# Patient Record
Sex: MALE | Race: BLACK OR AFRICAN AMERICAN
[De-identification: names, ages, dates, MRNs, and addresses within clinical notes are randomized per-mention and may not be internally consistent; named-entity substitution may affect disease eponyms.]

---

## 2019-08-12 NOTE — EMERGENCY ROOM REPORT
History of Present Illness


General


Chief Complaint:  Male Urogenital Problems


Source:  Patient





Present Illness


HPI


49-year-old male with no significant past medical history here requesting to be 

tested for chlamydia and gonorrhea and is requesting prophylactic antibiotic 

treatment as he had recent exposure to an unknown sexually transmitted 

diseases.  Patient is here with his significant other was also complaining of 

vaginal discharge.  Patient is complaining of clear penile discharge denies 

dysuria urinary frequency.  Denies fever and chills.  Denies abdominal pain, 

nausea vomiting, chest pain, shortness of breath, and all other associated 

symptoms.  Has not taken medication for his symptoms.


Allergies:  


Coded Allergies:  


     No Known Allergies (Unverified , 8/12/19)





Patient History


Past Medical History:  see triage record


Past Surgical History:  unable to obtain


Pertinent Family History:  none


Immunizations:  UTD


Reviewed Nursing Documentation:  PMH: Agreed; PSxH: Agreed





Review of Systems


All Other Systems:  negative except mentioned in HPI





Physical Exam





Vital Signs








  Date Time  Temp Pulse Resp B/P (MAP) Pulse Ox O2 Delivery O2 Flow Rate FiO2


 


8/12/19 13:46 98.1 78 19 130/86 (101) 99 Room Air  








Sp02 EP Interpretation:  reviewed, normal


General Appearance:  normal inspection, well appearing, no apparent distress


Head:  normocephalic, atraumatic


Eyes:  bilateral eye normal inspection, bilateral eye PERRL


ENT:  normal ENT inspection, hearing grossly normal, normal pharynx


Neck:  normal inspection, supple


Respiratory:  normal inspection, chest non-tender, lungs clear, no rhonchi, no 

wheezing


Cardiovascular #1:  normal inspection, regular rate, rhythm, no edema


Gastrointestinal:  normal inspection, non tender, soft


Rectal:  deferred


Genitourinary:  no CVA tenderness


Musculoskeletal:  back normal


Neurologic:  normal inspection, alert, oriented x3


Psychiatric:  normal inspection, judgement/insight normal


Skin:  no rash


Lymphatic:  normal inspection, no adenopathy





Medical Decision Making


PA Attestation


All diagnoses and treatment plans were reviewed and discussed with my 

supervising physician Dr. Powell


Diagnostic Impression:  


 Primary Impression:  


 STD exposure


ER Course


49-year-old male with no significant past medical history here requesting to be 

tested for chlamydia and gonorrhea and is requesting prophylactic antibiotic 

treatment as he had recent exposure to an unknown sexually transmitted 

diseases.  Patient is here with his significant other was also complaining of 

vaginal discharge.  Patient is complaining of clear penile discharge denies 

dysuria urinary frequency.  Denies fever and chills.  Denies abdominal pain, 

nausea vomiting, chest pain, shortness of breath, and all other associated 

symptoms.  Has not taken medication for his symptoms.





Ddx considered but are not limited to: vaginitis, yeast infection, BV, chlamydia

, Gohnorrea, syphylis, HIV, herpes 1 or 2














Vital signs: are WNL, pt. is afebrile








 H&PE are most consistent with : STD exposure possibly gonorrhea or chlamydia














ORDERS: UA, chlamydia and gonorrhea, Rocephin, azithromycin











ED INTERVENTIONS: Rocephin IM and azithromycin were given




















DISCHARGE: At this time pt. is stable for d/c to home. Will provide printed 

patient care instructions, and any necessary prescriptions. Care plan and 

follow up instructions have been discussed with the patient prior to discharge.

  Pending results for chlamydia and gonorrhea follow-up with her primary care 

provider if symptoms continue if patient's significant other test is positive 

for trichomoniasis, patient will also be tested and treated.





Last Vital Signs








  Date Time  Temp Pulse Resp B/P (MAP) Pulse Ox O2 Delivery O2 Flow Rate FiO2


 


8/12/19 13:58 98.1 85 20 129/79 99 Room Air  








Disposition:  HOME, SELF-CARE


Condition:  Stable


Patient Instructions:  Chlamydia, Male, Gonorrhea





Additional Instructions:  


Take medication as directed follow-up with your primary care provider if 

symptoms do not improve











Shorty Salinas Aug 12, 2019 14:04

## 2019-08-12 NOTE — NUR
ED Nurse Note:



Pt has been experiencing penile white discharge with fishy odor x 1 week. AOx4, 
VSS feng. Will cont to monitor.

## 2019-10-17 NOTE — EMERGENCY ROOM REPORT
History of Present Illness


General


Chief Complaint:  Multiple Trauma/Fall


Source:  Patient





Present Illness


HPI


This patient states that he fell about 10 feet.  He was climbing a gait to get 

a basketball out of a tree and lost his balance.  He states that he hit his 

head and has a headache.  He also complains of a nosebleed.  He complains of 

pain in his left upper arm, elbow and forearm.  He denies chest pain or 

shortness of breath.  He denies abdominal pain.  He denies right arm or lower 

extremity pain.  He denies loss of consciousness.  He denies weakness.  He 

denies tingling or numbness.  He has no other complaints.


Allergies:  


Coded Allergies:  


     No Known Allergies (Unverified , 8/12/19)





Patient History


Past Medical History:  none, see triage record


Social History:  Denies: smoking, alcohol use, drug use


Reviewed Nursing Documentation:  PMH: Agreed; PSxH: Agreed





Nursing Documentation-PMH


Past Medical History:  No History, Except For





Review of Systems


All Other Systems:  negative except mentioned in HPI





Physical Exam





Vital Signs








  Date Time  Temp Pulse Resp B/P (MAP) Pulse Ox O2 Delivery O2 Flow Rate FiO2


 


10/17/19 17:50 97.9 73 18 142/98 (113) 99 Room Air  








Sp02 EP Interpretation:  reviewed, normal


General Appearance:  no apparent distress, alert, GCS 15, non-toxic


Head:  normocephalic, other - Swelling over L. parietal area.


Eyes:  bilateral eye normal inspection, bilateral eye PERRL


ENT:  hearing grossly normal, normal pharynx, no angioedema, normal voice, 

other - +nosebleed


Neck:  full range of motion, supple/symm/no masses, tender lateral


Respiratory:  chest non-tender, lungs clear, normal breath sounds, no 

respiratory distress, no retraction, no accessory muscle use, speaking full 

sentences


Cardiovascular #1:  regular rate, rhythm, no edema


Gastrointestinal:  normal bowel sounds, non tender, soft, non-distended, no 

guarding, no rebound


Rectal:  deferred


Musculoskeletal:  back normal, tender - TTP over L. elbow, L. humerus and L. 

forearm.  PT will not move elbow secondary to pain.


Neurologic:  alert, oriented x3, responsive, motor strength/tone normal, 

sensory intact, speech normal


Psychiatric:  judgement/insight normal, memory normal, mood/affect normal, no 

suicidal/homicidal ideation


Skin:  normal color





Medical Decision Making


Diagnostic Impression:  


 Primary Impression:  


 Scalp hematoma


 Additional Impressions:  


 Closed head injury


 Maxillary sinus fracture


 Zygomatic fracture, left side, initial encounter for closed fracture


 Radial head fracture


ER Course


This patient fell from a 10 foot gait.  CT of the head shows no intracranial 

bleed.  However, CT face did show a zygomatic bone fracture and a maxillary 

sinus fracture.  These are nondisplaced.  I did discuss the case with on-call 

facial plastics and this will heal on its own spontaneously.  As a precaution I 

did give IV Ancef.  I will place the patient on a course of oral antibiotics 

for prophylaxis.  There is no surgical indication.  The patient is also found 

to have a left radial head fracture.  He was placed in a long-arm splint and 

sling.  He was instructed to follow-up closely with orthopedics.  Overall, the 

patient is well-appearing.  He is given close return precautions and follow-up 

instructions.





Laboratory Tests








Test


  10/17/19


18:56


 


White Blood Count


  10.3 K/UL


(4.8-10.8)


 


Red Blood Count


  4.83 M/UL


(4.70-6.10)


 


Hemoglobin


  16.1 G/DL


(14.2-18.0)


 


Hematocrit


  45.6 %


(42.0-52.0)


 


Mean Corpuscular Volume 94 FL (80-99)  


 


Mean Corpuscular Hemoglobin


  33.4 PG


(27.0-31.0)  H


 


Mean Corpuscular Hemoglobin


Concent 35.4 G/DL


(32.0-36.0)


 


Red Cell Distribution Width


  10.8 %


(11.6-14.8)  L


 


Platelet Count


  253 K/UL


(150-450)


 


Mean Platelet Volume


  5.8 FL


(6.5-10.1)  L


 


Neutrophils (%) (Auto)


  79.5 %


(45.0-75.0)  H


 


Lymphocytes (%) (Auto)


  13.4 %


(20.0-45.0)  L


 


Monocytes (%) (Auto)


  6.1 %


(1.0-10.0)


 


Eosinophils (%) (Auto)


  0.4 %


(0.0-3.0)


 


Basophils (%) (Auto)


  0.6 %


(0.0-2.0)


 


Prothrombin Time


  10.7 SEC


(9.30-11.50)


 


Prothrombin Time INR 1.0 (0.9-1.1)  


 


PTT


  25 SEC (23-33)


 


 


Sodium Level


  145 MMOL/L


(136-145)


 


Potassium Level


  4.0 MMOL/L


(3.5-5.1)


 


Chloride Level


  107 MMOL/L


()


 


Carbon Dioxide Level


  30 MMOL/L


(21-32)


 


Anion Gap


  8 mmol/L


(5-15)


 


Blood Urea Nitrogen


  11 mg/dL


(7-18)


 


Creatinine


  1.0 MG/DL


(0.55-1.30)


 


Estimate Glomerular


Filtration Rate > 60 mL/min


(>60)


 


Glucose Level


  97 MG/DL


()


 


Calcium Level


  9.2 MG/DL


(8.5-10.1)


 


Total Bilirubin


  0.7 MG/DL


(0.2-1.0)


 


Aspartate Amino Transferase


(AST) 17 U/L (15-37)


 


 


Alanine Aminotransferase (ALT)


  19 U/L (12-78)


 


 


Alkaline Phosphatase


  61 U/L


()


 


Total Protein


  7.5 G/DL


(6.4-8.2)


 


Albumin


  3.9 G/DL


(3.4-5.0)


 


Globulin 3.6 g/dL  


 


Albumin/Globulin Ratio 1.1 (1.0-2.7)  


 


Serum Alcohol < 3 mg/dL  











Last Vital Signs








  Date Time  Temp Pulse Resp B/P (MAP) Pulse Ox O2 Delivery O2 Flow Rate FiO2


 


10/17/19 17:55  73 18   Room Air  


 


10/17/19 17:55 97.9   142/98 99   








Status:  improved


Disposition:  HOME, SELF-CARE


Condition:  Improved


Referrals:  


[facility]ERR[facility] (PCP)











Genoveva Stewart DO Oct 17, 2019 19:32

## 2019-10-17 NOTE — DIAGNOSTIC IMAGING REPORT
Indication: Neck pain status post injury

 

Technique: CT cervical spine was performed utilizing automated exposure control

without intravenous contrast material. Axial, sagittal and coronal images were

generated.

 

CT dose: Total .1 mGycm; CTDI vol 12.9 mGy

 

Comparison: None

 

Findings: 

No acute cervical spine fracture is identified. There is straightening of the

cervical lordosis, without evidence of spondylolisthesis. Mild degenerative changes

noted at C5-C6 and C6-C7. No focus of significant/high-grade central canal stenosis.

No focus of high-grade bony foraminal narrowing. Please note that the central cord,

disks and nerve roots are better evaluated on MRI, which can be obtained for more

sensitive evaluation as clinically indicated. No prevertebral hematoma or fluid

collection. Thyroid normal in appearance. Mild scarring noted in the bilateral lung

apices.

 

 

IMPRESSION:

 

No acute cervical spine fracture.

 

Nonspecific straightening of the cervical lordosis without evidence of

spondylolisthesis. This may be related to positioning or muscle spasm.

 

Mild degenerative changes of the lower cervical spine.

 

Salient findings correspond with the statrad preliminary report.

 

The CT scanner at [facility] is accredited by the American College of

Radiology and the scans are performed using protocols designed to limit radiation

exposure to as low as reasonably achievable to attain images of sufficient resolution

adequate for diagnostic evaluation.

## 2019-10-17 NOTE — DIAGNOSTIC IMAGING REPORT
Indication: Facial pain status post injury

 

Technique: CT maxillofacial was performed utilizing automated exposure control

without intravenous contrast material. Axial and coronal images were generated.

 

CT dose: Total .2 mGycm; CTDI vol 25.1 mGy

 

Comparison: None

 

Findings: 

There is an acute nondisplaced fracture through the left zygomatic arch. There is

also acute fracture through the posterior wall of the left maxillary sinus with some

adjacent gas in the soft tissues and fluid in the left maxillary sinus. Some fluid is

also noted within left-sided ethmoid air cells. Globes are symmetric in appearance.

There is no infiltration of the orbital fat bilaterally. Extraocular muscles and

optic nerves are symmetric in appearance bilaterally.

 

IMPRESSION: 

Acute nondisplaced fractures through the left zygomatic arch and posterior wall of

the left maxillary sinus.

 

This corresponds with the statrad preliminary report.

 

 

 

The CT scanner at [facility] is accredited by the American College of

Radiology and the scans are performed using protocols designed to limit radiation

exposure to as low as reasonably achievable to attain images of sufficient resolution

adequate for diagnostic evaluation.

## 2019-10-17 NOTE — NUR
ED Nurse Note:

Patient wheeled into ED accompanied by wife c/o fall, patient states that he 
fell while trying to get a basketball, states that the height of the fence was 
10 feet tall, complains of generalized left sided body pain, unable to extend 
arm without pain. patient is alert and oriented x4, when patient sneezed, 
patient did have blood tinged output. will continue to monitor

## 2019-10-17 NOTE — DIAGNOSTIC IMAGING REPORT
Indication: Pain status post injury/trauma

 

Technique: Continuous helical CT scanning of the head was performed utilizing

automated exposure control without intravenous contrast material. Axial and coronal

reconstructions were obtained.

 

Comparison: None

 

CT dose: Total DLP 1457.7 mGycm; CTDI vol 62 mGy

 

Findings: There is no acute intracranial hemorrhage, mass effect or cortical edema.

There is no shift of the midline structures. Gray-white differentiation appears

preserved. The ventricles, cisterns and sulci are within normal limits for age. There

is soft tissue swelling of the left parietal scalp. There is no depressed skull

fracture. Fractures of the left zygomatic arch and left lateral orbital wall are

noted. Mucosal thickening versus fluid noted within the sphenoid sinuses and left

ethmoid air cells and left maxillary sinus. Mastoid air cells clear.

 

IMPRESSION: 

 

No evidence of acute intracranial hemorrhage, mass effect or cortical edema. 

 

Fracture of the left zygoma partially visualized. Please see dedicated reports of

concurrent CT of the facial bones.

 

This corresponds with the statrad preliminary report.

 

The CT scanner at [facility] is accredited by the American College of

Radiology and the scans are performed using protocols designed to limit radiation

exposure to as low as reasonably achievable to attain images of sufficient resolution

adequate for diagnostic evaluation.

## 2019-10-18 NOTE — DIAGNOSTIC IMAGING REPORT
Indication: Pain status post injury/trauma

 

Technique: XRAY Humerus 2v L

 

Comparison: None

 

FINDINGS/IMPRESSION: 

 

Fracture of the radial head is noted, described on dedicated elbow radiographs. No

humeral fracture is identified. Elbow and shoulder joints are maintained, without

evidence of dislocation. Imaged portions of the left lung are clear. No radiopaque

foreign body.

## 2019-10-18 NOTE — DIAGNOSTIC IMAGING REPORT
Indication: Pain status post injury

 

Technique: XRAY Forearm 2v L

 

Comparison: None

 

Findings/Impression:

 

Bone mineralization within normal limits. Fracture of the radial head is again noted,

as described on dedicated elbow radiographs. No additional acute fractures

identified. No radiopaque foreign body.

## 2019-10-18 NOTE — DIAGNOSTIC IMAGING REPORT
Indication: Pain status post injury

 

Technique: XRAY Elbow Min 3v L

 

Comparison: None

 

Findings: Bony mineralization within normal limits. There is an acute fracture of the

radial head without significant displacement. No associated elbow joint dislocation.

There is a small elbow joint effusion with elevation of the anterior fat pad. No

radiopaque foreign body.

 

IMPRESSION: 

Acute radial head fracture. 

 

This corresponds with the preliminary interpretation of the treating ER physician, as

documented in the electronic medical record.

## 2019-10-28 NOTE — NUR
ER DISCHARGE NOTE:

Patient is cleared to be discharged per ERMD, pt is aox4, on room air, with 
stable vital signs. pt was given dc instructions, pt was able to verbalize 
understanding, pt id band removed without complications. pt is able to ambulate 
with steady gait. pt took all belongings.

## 2019-10-28 NOTE — NUR
ED Nurse Note:



pt walked in to ED for f/u check up related to Fx to left arm.  pt is alert x4. 
VSS

## 2019-10-28 NOTE — EMERGENCY ROOM REPORT
History of Present Illness


General


Chief Complaint:  General Complaint


Source:  Patient





Present Illness


HPI


49-year-old male presents ED for evaluation.  Is here for reevaluation of his 

left arm.  States he was seen here about 2 weeks ago.  Sustained a radial head 

fracture.  Was placed in a splint with sling.  States that he took off the 

sling because it was uncomfortable.  Is here for follow-up because he does not 

have an orthopedic doctor to see.  States pain is improved.  Denies pain at 

this time.  No other aggravating relieving factors.  Denies any other 

associated symptoms


Allergies:  


Coded Allergies:  


     No Known Allergies (Unverified , 8/12/19)





Patient History


Past Medical History:  none


Past Surgical History:  none


Pertinent Family History:  none


Social History:  Denies: smoking, alcohol use, drug use


Immunizations:  UTD


Reviewed Nursing Documentation:  PMH: Agreed; PSxH: Agreed





Nursing Documentation-PMH


Past Medical History:  No Stated History





Review of Systems


All Other Systems:  negative except mentioned in HPI





Physical Exam





Vital Signs








  Date Time  Temp Pulse Resp B/P (MAP) Pulse Ox O2 Delivery O2 Flow Rate FiO2


 


10/28/19 21:56 98.2 58 16 122/80 (94) 98   


 


10/28/19 22:02      Room Air  98








Sp02 EP Interpretation:  reviewed, normal


General Appearance:  no apparent distress, alert, GCS 15, non-toxic


Head:  normocephalic, atraumatic


Eyes:  bilateral eye normal inspection, bilateral eye PERRL


ENT:  hearing grossly normal, normal pharynx, no angioedema, normal voice


Neck:  full range of motion, supple/symm/no masses


Respiratory:  chest non-tender, lungs clear, normal breath sounds, speaking 

full sentences


Cardiovascular #1:  regular rate, rhythm, no edema


Cardiovascular #2:  2+ carotid (R), 2+ carotid (L), 2+ radial (R), 2+ radial (L)

, 2+ dorsalis pedis (R), 2+ dorsalis pedis (L)


Gastrointestinal:  normal bowel sounds, non tender, soft, non-distended, no 

guarding, no rebound


Rectal:  deferred


Genitourinary:  normal inspection, no CVA tenderness


Musculoskeletal:  back normal, gait/station normal, decreased range of motion, 

tender - L elbow


Neurologic:  alert, oriented x3, responsive, motor strength/tone normal, 

sensory intact, speech normal


Psychiatric:  judgement/insight normal, memory normal, mood/affect normal, no 

suicidal/homicidal ideation


Reflexes:  3+ bicep (R), 3+ bicep (L), 3+ tricep (R), 3+ tricep (L), 3+ knee (R)

, 3+ knee (L)


Lymphatic:  no adenopathy





Procedures


Splinting


Splinting :  


   Consent:  Verbal


   Splint:  posterior long


   Pre-Proc Neuro Vasc Exam:  normal


   Post-Proc Neuro Vasc Exam:  normal


   Patient Tolerated:  Well


   Complications:  None





Medical Decision Making


Diagnostic Impression:  


 Primary Impression:  


 Radial head fracture


 Qualified Codes:  S52.125D - Nondisplaced fracture of head of left radius, 

subsequent encounter for closed fracture with routine healing


ER Course


Hospital Course 


50 yo M presents for reevaluation. s/p radial head fx





Clinical course


Patient placed on stretcher.  on exam patient has some pain to L elbow. limited 

ROM. minimal swelling, no bruising





I reviewed x-rays from previous visit which documents a radial head fracture.  

Patient does not have a PMD or orthopedist.  Will provide orthopedic referrals.





patient requires continued immobilization.  Placed in long splint today.  Safe 

for discharge for close outpatient follow-up





Diagnosis - radial head fx





Stable and discharged to home. Followup with ortho.  Return to ED if symptoms 

recur/worsen.





Last Vital Signs








  Date Time  Temp Pulse Resp B/P (MAP) Pulse Ox O2 Delivery O2 Flow Rate FiO2


 


10/28/19 22:30 98.0 85 18 128/84 98 Room Air  


 


10/28/19 22:02        98








Status:  improved


Disposition:  HOME, SELF-CARE


Condition:  Stable


Referrals:  


[facility]





[facility]  **Open 24 hour /7 days a week by Appointment Only**





[address]


[facility] [address]


Patient Instructions:  Radial Head Elbow Fracture With [facility]











Rob Hdez MD Oct 28, 2019 23:07

## 2020-02-01 ENCOUNTER — HOSPITAL ENCOUNTER (EMERGENCY)
Dept: HOSPITAL 72 - EMR | Age: 50
Discharge: HOME | End: 2020-02-01
Payer: MEDICAID

## 2020-02-01 VITALS — BODY MASS INDEX: 19.25 KG/M2 | WEIGHT: 150 LBS | HEIGHT: 74 IN

## 2020-02-01 VITALS — SYSTOLIC BLOOD PRESSURE: 134 MMHG | DIASTOLIC BLOOD PRESSURE: 88 MMHG

## 2020-02-01 VITALS — DIASTOLIC BLOOD PRESSURE: 71 MMHG | SYSTOLIC BLOOD PRESSURE: 119 MMHG

## 2020-02-01 DIAGNOSIS — N34.2: Primary | ICD-10-CM

## 2020-02-01 PROCEDURE — 96372 THER/PROPH/DIAG INJ SC/IM: CPT

## 2020-02-01 PROCEDURE — 99284 EMERGENCY DEPT VISIT MOD MDM: CPT

## 2020-02-01 PROCEDURE — 96374 THER/PROPH/DIAG INJ IV PUSH: CPT

## 2020-02-01 NOTE — NUR
ED Nurse Note:



PT WALKED IN TO ED C/O WHITE PENILE DISCHARGE X1DAY AND NOTICED FOUL SMELL IN 
URINE. PT STATES MILD DISCOMFORT DURING URINATION BUT DENIES PAIN. PT STATES 
MILD SWELLING AROUND THE TIP. PT STATES HAVING THIS SYMPTOMS IN THE PAST. VSS, 
NAD. WILL CONTINUE TO MONITOR PATIENT.

## 2020-02-02 NOTE — EMERGENCY ROOM REPORT
History of Present Illness


General


Chief Complaint:  Male Urogenital Problems


Source:  Patient





Present Illness


HPI


Patient 49-year-old male presents after increased urethral discharge.  He had 

reportedly had recent unprotected sex.  Prior history of sexually transmitted 

disease in the past.  Denies any testicular swelling or pain.  Denies any 

fever.  Had new sexual partner.  Denies any other symptoms.  He denies any 

flank pain.  Denies any hematuria.


Allergies:  


Coded Allergies:  


     No Known Allergies (Unverified , 8/12/19)





Patient History


Past Medical History:  see triage record


Reviewed Nursing Documentation:  PMH: Agreed; PSxH: Agreed





Nursing Documentation-PMH


Hx Cardiac Problems:  No - Hemorrhoid





Review of Systems


All Other Systems:  negative except mentioned in HPI





Physical Exam





Vital Signs








  Date Time  Temp Pulse Resp B/P (MAP) Pulse Ox O2 Delivery O2 Flow Rate FiO2


 


2/1/20 08:20 97.9 72 18 134/88 (103) 98 Room Air  








General Appearance:  well appearing, no apparent distress, alert, GCS 15


Head:  normocephalic, atraumatic


ENT:  hearing grossly normal, normal voice


Neck:  full range of motion, supple


Respiratory:  no respiratory distress, speaking full sentences


Cardiovascular #1:  normal inspection


Gastrointestinal:  normal inspection


Genitourinary:  no CVA tenderness


Neurologic:  alert, motor strength/tone normal, CNs III-XII nml as tested, 

normal gait


Psychiatric:  mood/affect normal


Skin:  no rash





Medical Decision Making


Diagnostic Impression:  


 Primary Impression:  


 Urethritis


ER Course


Patient presented for dysuria.  Differential diagnosis include was not limited 

to urethritis, urinary tract infection, prostatitis among others.  Patient has 

a benign exam and does not appear to require any imaging or laboratory testing 

at this time.  Patient appears to have a sexually transmitted infection.  He 

was given empiric treatment with Rocephin as well as prescription for 

doxycycline.  He was advised to follow-up with outpatient STD testing and to 

use condoms patient is advised to follow-up with his primary care physician for 

recheck.. The patient is advised to follow up with  primary care doctor in 1-2 

days.  Patient is advised to return if any worsening condition or if any 

changes in status that are concerning.





This report is dictated with Dragon transcription software which may 

occasionally lead to discrepancies related to use of this software.





Last Vital Signs








  Date Time  Temp Pulse Resp B/P (MAP) Pulse Ox O2 Delivery O2 Flow Rate FiO2


 


2/1/20 10:15 98.7 71 18 119/71 98 Room Air  








Status:  improved


Disposition:  HOME, SELF-CARE


Condition:  Stable


Scripts


Doxycycline Monohydrate* (DOXYCYCLINE MONOHYDRATE*) 100 Mg Capsule


100 MG ORAL Q12H, #14 CAP 0 Refills


   Prov: Husam Tellez MD         2/1/20


Patient Instructions:  Urethritis, Adult











Husam Tellez MD Feb 2, 2020 08:23